# Patient Record
(demographics unavailable — no encounter records)

---

## 2024-12-17 NOTE — HISTORY OF PRESENT ILLNESS
[FreeTextEntry1] : worsening gait impairment with progression since July 2024 [de-identified] : Ms. Hector Hernandez is a 26-year-old woman with an important medical history that includes the surgical removal of a left adrenal mass. She is currently seeking a thorough neurosurgical evaluation for a suspected thoracic spine tumor, likely a meningioma, upon the referral of Dr. Abrahan Weiss.  The onset of her symptoms dates back to July, when she first experienced significant challenges in her ability to walk, run, and maintain a normal stride. Alongside these mobility issues, she reports persistent numbness extending from her abdominal area to her anterior thigh. Initially, she attributed these symptoms to her previous adrenal surgery, leading her to believe they were a direct consequence of that procedure. Concerned about the worsening of her condition, she underwent abdominal imaging studies, which returned normal results, indicating no apparent abnormalities.  As her symptoms continued to intensify, she sought the expertise of a neurologist, who assessed her condition further. Ms. Hernandez describes ongoing sensory deficits that have not improved, though she explicitly denies experiencing any bowel or bladder dysfunction. Her chief concern remains her impaired gait, which has significantly impacted her daily life and mobility.

## 2024-12-17 NOTE — REVIEW OF SYSTEMS
[Leg Weakness] : leg weakness [Poor Coordination] : poor coordination [Numbness] : numbness [Tingling] : tingling [Abnormal Sensation] : an abnormal sensation [Difficulty Walking] : difficulty walking [Arthralgias] : arthralgias [Negative] : Heme/Lymph [FreeTextEntry9] : Spastic gait and impairment

## 2024-12-17 NOTE — ASSESSMENT
[FreeTextEntry1] : Ms. Hector Hernandez is a 26-year-old woman who has been diagnosed with a spinal meningioma located at the T4 vertebra. She is experiencing severe thoracic myelopathy, which has resulted in notable impairment of her mobility. Her condition  is characterized by pronounced gait ataxia, indicating a lack of coordination and control while walking. Her symptoms significantly impact her daily activities and quality of life.   Imaging and diagnostic workup evaluation show evidence of Meningioma T4 with spinal cord compression. The tumor measures 7X4 x5.4 CM t1 low T2    Plan: -Surgery Recommendation for Benign Spinal Tumor  Surgery is recommended for the resection of a spinal tumor that is likely benign. This recommendation was made following a shared decision-making process. The patient underwent conservative treatment, which was unsuccessful. During the consultation, all surgical and non-surgical options were thoroughly discussed. Based on a detailed review of imaging and patient examination, surgical intervention was presented as a means to halt the progression of symptoms.  We discussed the potential risks and benefits of surgery, as well as alternative treatment options. The patient was given adequate time to ask questions. Additionally, pre-operative requirements and post-operative recovery expectations were outlined, including information about the benefits and risks associated with the surgery.  The patient was instructed to stop taking all non-steroidal anti-inflammatory medications (such as ibuprofen or naproxen) and blood thinners (such as Coumadin, Plavix, or aspirin) 7-10 days before the surgery. They were also advised to refrain from using nicotine and consuming alcohol one week before and two weeks after the surgery to minimize the risk of bleeding and promote healing.  The patient has agreed to proceed with the surgery. Preoperative planning and care coordination are currently in progress, and the Spine Surgery Questionnaire, along with information about the Virtual Spine Class, has been provided. The Teachback Protocol has been implemented to ensure understanding.  Please refer to Dr. Xavier's dictation for further details.  I, Dr. Connor Xavier, evaluated the patient alongside Nurse Practitioner Barrett Maurice and established the plan of care. I discussed this patient with the nurse practitioner during the visit and agree with the assessment and plan as written, unless noted otherwise.

## 2024-12-17 NOTE — PHYSICAL EXAM
[General Appearance - Alert] : alert [General Appearance - In No Acute Distress] : in no acute distress [Oriented To Time, Place, And Person] : oriented to person, place, and time [Impaired Insight] : insight and judgment were intact [No Visual Abnormalities] : no visible abnormailities [] : no respiratory distress [Skin Color & Pigmentation] : normal skin color and pigmentation [de-identified] : Sensory deficit T2-T4 [FreeTextEntry1] : gait ataxia

## 2024-12-17 NOTE — HISTORY OF PRESENT ILLNESS
[FreeTextEntry1] : worsening gait impairment with progression since July 2024 [de-identified] : Ms. Hector Hernandez is a 26-year-old woman with an important medical history that includes the surgical removal of a left adrenal mass. She is currently seeking a thorough neurosurgical evaluation for a suspected thoracic spine tumor, likely a meningioma, upon the referral of Dr. Abrahan Weiss.  The onset of her symptoms dates back to July, when she first experienced significant challenges in her ability to walk, run, and maintain a normal stride. Alongside these mobility issues, she reports persistent numbness extending from her abdominal area to her anterior thigh. Initially, she attributed these symptoms to her previous adrenal surgery, leading her to believe they were a direct consequence of that procedure. Concerned about the worsening of her condition, she underwent abdominal imaging studies, which returned normal results, indicating no apparent abnormalities.  As her symptoms continued to intensify, she sought the expertise of a neurologist, who assessed her condition further. Ms. Hernandez describes ongoing sensory deficits that have not improved, though she explicitly denies experiencing any bowel or bladder dysfunction. Her chief concern remains her impaired gait, which has significantly impacted her daily life and mobility.

## 2024-12-17 NOTE — PHYSICAL EXAM
[General Appearance - Alert] : alert [General Appearance - In No Acute Distress] : in no acute distress [Oriented To Time, Place, And Person] : oriented to person, place, and time [Impaired Insight] : insight and judgment were intact [No Visual Abnormalities] : no visible abnormailities [] : no respiratory distress [Skin Color & Pigmentation] : normal skin color and pigmentation [de-identified] : Sensory deficit T2-T4 [FreeTextEntry1] : gait ataxia

## 2025-01-17 NOTE — REASON FOR VISIT
[de-identified] : S/P Posterior T4-5 exposure.  Bilateral T4-5 osteoplastic laminoplasty for intradural exploration.  Microdissection under the operating microscope.  Resection of massive T4-5 intradural extramedullary meningioma with critical high-grade spinal cord compression.  Dorsal osteoplastic reconstruction of the posterior elements using laminoplasty plates and screws.  Intraoperative ultrasound to confirm tumor resection.  [de-identified] : 12/30/24 [de-identified] : Today she is doing well and she is doing well with improved gait. She ambulates with cane. Her thoracic incision is well healed without s/s of infection noted. She reports minimal postop pain. She transitioned from Rehab to home and is doing well. She continues PT at home

## 2025-01-17 NOTE — REASON FOR VISIT
[de-identified] : S/P Posterior T4-5 exposure.  Bilateral T4-5 osteoplastic laminoplasty for intradural exploration.  Microdissection under the operating microscope.  Resection of massive T4-5 intradural extramedullary meningioma with critical high-grade spinal cord compression.  Dorsal osteoplastic reconstruction of the posterior elements using laminoplasty plates and screws.  Intraoperative ultrasound to confirm tumor resection.  [de-identified] : 12/30/24 [de-identified] : Today she is doing well and she is doing well with improved gait. She ambulates with cane. Her thoracic incision is well healed without s/s of infection noted. She reports minimal postop pain. She transitioned from Rehab to home and is doing well. She continues PT at home

## 2025-01-17 NOTE — REVIEW OF SYSTEMS
[Poor Coordination] : poor coordination [Arthralgias] : arthralgias [Negative] : Heme/Lymph [FreeTextEntry9] : Ambulates with cane

## 2025-01-17 NOTE — PHYSICAL EXAM
[General Appearance - Alert] : alert [General Appearance - In No Acute Distress] : in no acute distress [Longitudinal] : longitudinal [Clean] : clean [Healing Well] : healing well [No Drainage] : without drainage [Normal Skin] : normal [Oriented To Time, Place, And Person] : oriented to person, place, and time [Impaired Insight] : insight and judgment were intact [No Visual Abnormalities] : no visible abnormailities [Non- Antalgic] : non-antalgic [] : no respiratory distress [Heart Rate And Rhythm] : heart rate was normal and rhythm regular [FreeTextEntry8] : Ambulates with cane; Significant improvement in gait and mobility [FreeTextEntry1] : Ambulates with cane; Significant improvement in gait and mobility

## 2025-01-17 NOTE — HISTORY OF PRESENT ILLNESS
[FreeTextEntry1] : worsening gait impairment with progression since July 2024 [de-identified] : Ms. Hector Hernandez is a 26-year-old woman with an important medical history that includes the surgical removal of a left adrenal mass. She is currently seeking a thorough neurosurgical evaluation for a suspected thoracic spine tumor, likely a meningioma, upon the referral of Dr. Abrahan Weiss.  The onset of her symptoms dates back to July, when she first experienced significant challenges in her ability to walk, run, and maintain a normal stride. Alongside these mobility issues, she reports persistent numbness extending from her abdominal area to her anterior thigh. Initially, she attributed these symptoms to her previous adrenal surgery, leading her to believe they were a direct consequence of that procedure. Concerned about the worsening of her condition, she underwent abdominal imaging studies, which returned normal results, indicating no apparent abnormalities.  As her symptoms continued to intensify, she sought the expertise of a neurologist, who assessed her condition further. Ms. Hernandez describes ongoing sensory deficits that have not improved, though she explicitly denies experiencing any bowel or bladder dysfunction. Her chief concern remains her impaired gait, which has significantly impacted her daily life and mobility.

## 2025-01-17 NOTE — ASSESSMENT
[FreeTextEntry1] : Ms. HELAG SAMPSON is a 26 year- old- woman who presents with a diagnosis of S/P Posterior Thoracic resection of Intradural extramedullary spinal cord tumor at T4-5 causing high-grade spinal cord compression and paraplegia on 12/30/24 with significant neurological gait improvement. Pathology confirmed WHO Grade 1 Meningioma. She completed an extended course of inpatient Rehab. She is doing well. She takes Tylenol as needed for pain.   Plan: Transparent dressing removed and incision cleansed with alcohol wipes and betadine. Instructions for showering provided.      Follow up: with Dr. Xavier in March 2025 with New MRI Thoracic Spine w/wo contrast to assess for residual tumor.

## 2025-01-17 NOTE — HISTORY OF PRESENT ILLNESS
[FreeTextEntry1] : worsening gait impairment with progression since July 2024 [de-identified] : Ms. Hector Hernandez is a 26-year-old woman with an important medical history that includes the surgical removal of a left adrenal mass. She is currently seeking a thorough neurosurgical evaluation for a suspected thoracic spine tumor, likely a meningioma, upon the referral of Dr. Abrahan Weiss.  The onset of her symptoms dates back to July, when she first experienced significant challenges in her ability to walk, run, and maintain a normal stride. Alongside these mobility issues, she reports persistent numbness extending from her abdominal area to her anterior thigh. Initially, she attributed these symptoms to her previous adrenal surgery, leading her to believe they were a direct consequence of that procedure. Concerned about the worsening of her condition, she underwent abdominal imaging studies, which returned normal results, indicating no apparent abnormalities.  As her symptoms continued to intensify, she sought the expertise of a neurologist, who assessed her condition further. Ms. Hernandez describes ongoing sensory deficits that have not improved, though she explicitly denies experiencing any bowel or bladder dysfunction. Her chief concern remains her impaired gait, which has significantly impacted her daily life and mobility.

## 2025-01-17 NOTE — ASSESSMENT
[FreeTextEntry1] : Ms. HELGA SAMPSON is a 26 year- old- woman who presents with a diagnosis of S/P Posterior Thoracic resection of Intradural extramedullary spinal cord tumor at T4-5 causing high-grade spinal cord compression and paraplegia on 12/30/24 with significant neurological gait improvement. Pathology confirmed WHO Grade 1 Meningioma. She completed an extended course of inpatient Rehab. She is doing well. She takes Tylenol as needed for pain.   Plan: Transparent dressing removed and incision cleansed with alcohol wipes and betadine. Instructions for showering provided.      Follow up: with Dr. Xavier in March 2025 with New MRI Thoracic Spine w/wo contrast to assess for residual tumor.

## 2025-03-17 NOTE — PHYSICAL EXAM
[General Appearance - In No Acute Distress] : in no acute distress [General Appearance - Alert] : alert [Longitudinal] : longitudinal [Well-Healed] : well-healed [Intact] : intact [Oriented To Time, Place, And Person] : oriented to person, place, and time [Motor Tone] : muscle tone was normal in all four extremities [Motor Strength] : muscle strength was normal in all four extremities [Sensation Tactile Decrease] : light touch was intact [Balance] : balance was intact [Abnormal Walk] : normal gait

## 2025-03-17 NOTE — PHYSICAL EXAM
[General Appearance - Alert] : alert [General Appearance - In No Acute Distress] : in no acute distress [Longitudinal] : longitudinal [Well-Healed] : well-healed [Intact] : intact [Oriented To Time, Place, And Person] : oriented to person, place, and time [Motor Tone] : muscle tone was normal in all four extremities [Motor Strength] : muscle strength was normal in all four extremities [Sensation Tactile Decrease] : light touch was intact [Abnormal Walk] : normal gait [Balance] : balance was intact

## 2025-03-19 NOTE — HISTORY OF PRESENT ILLNESS
[FreeTextEntry1] : 27-year-old female patient with past medical history of adrenal ganglioneuroma s/p L total adrenalectomy in 2021 who presented with a complaint of upper back pain with radiation to the legs as well as right abdominal and leg numbness. She was found on outpatient work up to have a T4 spinal meningioma. Surgical management was recommended. She underwent posterior T4-T5 Laminoplasty for Intradural tumor resection on 12/30/24 and surgery was uncomplicated.  She was discharged to Acute inpatient rehab and discharged on 1/14/25 from rehab. Pathology c/w WHO grade 1 meningioma. She had significant improvement after surgery.   She presents today with repeat MRI thoracic spine w/wo contrast.  Overall doing well, some numbness in the T4-5 dermatomes otherwise no complaints.

## 2025-03-19 NOTE — ASSESSMENT
[FreeTextEntry1] : 26 y/o F, with a T4 spinal meningioma s/p posterior T4-T5 Laminoplasty for Intradural tumor resection on 12/30/24. Pathology c/w WHO grade 1 meningioma. She had significant improvement after surgery.  She presents today with repeat MRI thoracic spine w/wo contrast.  Overall doing well, some numbness in the T4-5 dermatomes otherwise no complaints.  Will see her back in 6 months.

## 2025-03-19 NOTE — REASON FOR VISIT
[de-identified] : Posterior T4-T5 Laminoplasty for Intradural Tumor resection [de-identified] : 12/30/24

## 2025-03-19 NOTE — ASSESSMENT
[FreeTextEntry1] : 28 y/o F, with a T4 spinal meningioma s/p posterior T4-T5 Laminoplasty for Intradural tumor resection on 12/30/24. Pathology c/w WHO grade 1 meningioma. She had significant improvement after surgery.  She presents today with repeat MRI thoracic spine w/wo contrast.  Overall doing well, some numbness in the T4-5 dermatomes otherwise no complaints.  Will see her back in 6 months.

## 2025-03-19 NOTE — REASON FOR VISIT
[de-identified] : Posterior T4-T5 Laminoplasty for Intradural Tumor resection [de-identified] : 12/30/24

## 2025-07-08 NOTE — PHYSICAL EXAM
[FreeTextEntry1] : The patient is alert and oriented to person, place and time.  Higher cortical functions are intact.  The patient moves all extremities well and to command.  There is no evidence of focal motor or sensory deficits.  Gait is normal.  [General Appearance - Alert] : alert [General Appearance - In No Acute Distress] : in no acute distress [General Appearance - Well Nourished] : well nourished [General Appearance - Well Developed] : well developed [General Appearance - Well-Appearing] : healthy appearing [] : normal voice and communication [Oriented To Time, Place, And Person] : oriented to person, place, and time [Impaired Insight] : insight and judgment were intact [Affect] : the affect was normal [Mood] : the mood was normal [Memory Recent] : recent memory was not impaired [Memory Remote] : remote memory was not impaired

## 2025-07-08 NOTE — ASSESSMENT
[FreeTextEntry1] : 28 y/o F, with a T4 spinal meningioma s/p posterior T4-T5 Laminoplasty for Intradural tumor resection on 12/30/24. Pathology c/w WHO grade 1 meningioma. She had significant improvement after surgery.  She stated that she is doing well but experiences some paresthesias in both legs.  Plan: MRI of the thoracic spine with and without contrast. Return with the images for review.   Please see Dr. Xavier's dictation for details. I, Dr. Connor Xavier evaluated the patient with nurse practitioner ERA Anderson and established the plan of care. I personally discuss this patient with the NP at the time of the visit. I agree with the assessment and plan as written, unless noted below.

## 2025-07-08 NOTE — REASON FOR VISIT
1. Have you been to the ER, urgent care clinic since your last visit? Hospitalized since your last visit? No    2. Have you seen or consulted any other health care providers outside of the [address] [place] [address] since your last visit? Include any pap smears or colon screening.  No     Chief Complaint   Patient presents with    Diarrhea     x 1 month after meals    Abdominal Pain [Follow-Up: _____] : a [unfilled] follow-up visit [FreeTextEntry1] : Today the patient reports doing well but recently experiencing paresthesia's in her legs.  She denies weakness or bowel or bladder dysfunction.

## 2025-07-08 NOTE — REASON FOR VISIT
[Follow-Up: _____] : a [unfilled] follow-up visit [FreeTextEntry1] : Today the patient reports doing well but recently experiencing paresthesia's in her legs.  She denies weakness or bowel or bladder dysfunction.

## 2025-07-08 NOTE — ASSESSMENT
[FreeTextEntry1] : 26 y/o F, with a T4 spinal meningioma s/p posterior T4-T5 Laminoplasty for Intradural tumor resection on 12/30/24. Pathology c/w WHO grade 1 meningioma. She had significant improvement after surgery.  She stated that she is doing well but experiences some paresthesias in both legs.  Plan: MRI of the thoracic spine with and without contrast. Return with the images for review.   Please see Dr. Xavier's dictation for details. I, Dr. Connor Xavier evaluated the patient with nurse practitioner ERA Anderson and established the plan of care. I personally discuss this patient with the NP at the time of the visit. I agree with the assessment and plan as written, unless noted below.